# Patient Record
Sex: FEMALE | Race: WHITE | NOT HISPANIC OR LATINO
[De-identification: names, ages, dates, MRNs, and addresses within clinical notes are randomized per-mention and may not be internally consistent; named-entity substitution may affect disease eponyms.]

---

## 2019-02-25 ENCOUNTER — APPOINTMENT (OUTPATIENT)
Dept: CARDIOLOGY | Facility: CLINIC | Age: 79
End: 2019-02-25
Payer: MEDICARE

## 2019-02-25 VITALS
HEART RATE: 87 BPM | DIASTOLIC BLOOD PRESSURE: 57 MMHG | WEIGHT: 169 LBS | SYSTOLIC BLOOD PRESSURE: 112 MMHG | OXYGEN SATURATION: 97 %

## 2019-02-25 DIAGNOSIS — Z80.0 FAMILY HISTORY OF MALIGNANT NEOPLASM OF DIGESTIVE ORGANS: ICD-10-CM

## 2019-02-25 DIAGNOSIS — Z78.9 OTHER SPECIFIED HEALTH STATUS: ICD-10-CM

## 2019-02-25 DIAGNOSIS — Z86.010 PERSONAL HISTORY OF COLONIC POLYPS: ICD-10-CM

## 2019-02-25 DIAGNOSIS — Z87.19 PERSONAL HISTORY OF OTHER DISEASES OF THE DIGESTIVE SYSTEM: ICD-10-CM

## 2019-02-25 DIAGNOSIS — Z87.898 PERSONAL HISTORY OF OTHER SPECIFIED CONDITIONS: ICD-10-CM

## 2019-02-25 DIAGNOSIS — Z86.39 PERSONAL HISTORY OF OTHER ENDOCRINE, NUTRITIONAL AND METABOLIC DISEASE: ICD-10-CM

## 2019-02-25 DIAGNOSIS — Z86.79 PERSONAL HISTORY OF OTHER DISEASES OF THE CIRCULATORY SYSTEM: ICD-10-CM

## 2019-02-25 DIAGNOSIS — Z82.3 FAMILY HISTORY OF STROKE: ICD-10-CM

## 2019-02-25 DIAGNOSIS — F41.8 OTHER SPECIFIED ANXIETY DISORDERS: ICD-10-CM

## 2019-02-25 DIAGNOSIS — M19.90 UNSPECIFIED OSTEOARTHRITIS, UNSPECIFIED SITE: ICD-10-CM

## 2019-02-25 DIAGNOSIS — Z86.19 PERSONAL HISTORY OF OTHER INFECTIOUS AND PARASITIC DISEASES: ICD-10-CM

## 2019-02-25 DIAGNOSIS — Z86.69 PERSONAL HISTORY OF OTHER DISEASES OF THE NERVOUS SYSTEM AND SENSE ORGANS: ICD-10-CM

## 2019-02-25 PROCEDURE — 99215 OFFICE O/P EST HI 40 MIN: CPT

## 2019-02-25 PROCEDURE — 93000 ELECTROCARDIOGRAM COMPLETE: CPT

## 2019-02-27 ENCOUNTER — NON-APPOINTMENT (OUTPATIENT)
Age: 79
End: 2019-02-27

## 2019-02-27 PROBLEM — Z80.0 FAMILY HISTORY OF MALIGNANT NEOPLASM OF STOMACH: Status: ACTIVE | Noted: 2019-02-27

## 2019-02-27 PROBLEM — Z87.19 HISTORY OF DIVERTICULOSIS: Status: RESOLVED | Noted: 2019-02-27 | Resolved: 2019-02-27

## 2019-02-27 PROBLEM — Z86.79 HISTORY OF HYPERTENSION: Status: RESOLVED | Noted: 2019-02-27 | Resolved: 2019-02-27

## 2019-02-27 PROBLEM — Z86.79 HISTORY OF RIGHT BUNDLE BRANCH BLOCK (RBBB): Status: RESOLVED | Noted: 2019-02-27 | Resolved: 2019-02-27

## 2019-02-27 PROBLEM — Z78.9 SOCIAL ALCOHOL USE: Status: ACTIVE | Noted: 2019-02-27

## 2019-02-27 PROBLEM — Z86.39 HISTORY OF OBESITY: Status: RESOLVED | Noted: 2019-02-27 | Resolved: 2019-02-27

## 2019-02-27 PROBLEM — Z80.0 FAMILY HISTORY OF MALIGNANT NEOPLASM OF COLON: Status: ACTIVE | Noted: 2019-02-27

## 2019-02-27 PROBLEM — Z82.3 FAMILY HISTORY OF CEREBROVASCULAR ACCIDENT (CVA): Status: ACTIVE | Noted: 2019-02-27

## 2019-02-27 RX ORDER — UBIDECARENONE/VIT E ACET 100MG-5
CAPSULE ORAL
Refills: 0 | Status: ACTIVE | COMMUNITY

## 2019-02-27 RX ORDER — CHOLECALCIFEROL (VITAMIN D3) 25 MCG
TABLET ORAL
Refills: 0 | Status: ACTIVE | COMMUNITY

## 2019-02-28 PROBLEM — Z86.010 HISTORY OF COLONIC POLYPS: Status: RESOLVED | Noted: 2019-02-27 | Resolved: 2019-02-28

## 2019-02-28 PROBLEM — Z86.19 HISTORY OF HERPES ZOSTER: Status: RESOLVED | Noted: 2019-02-28 | Resolved: 2019-02-28

## 2019-02-28 PROBLEM — Z86.39 HISTORY OF HYPERLIPIDEMIA: Status: RESOLVED | Noted: 2019-02-27 | Resolved: 2019-02-28

## 2019-02-28 PROBLEM — M19.90 OSTEOARTHRITIS: Status: RESOLVED | Noted: 2019-02-28 | Resolved: 2019-02-28

## 2019-02-28 PROBLEM — F41.8 ANXIOUS DEPRESSION: Status: RESOLVED | Noted: 2019-02-28 | Resolved: 2019-02-28

## 2019-02-28 PROBLEM — Z87.898 HISTORY OF PALPITATIONS: Status: RESOLVED | Noted: 2019-02-28 | Resolved: 2019-02-28

## 2019-02-28 PROBLEM — Z86.69 HISTORY OF CARPAL TUNNEL SYNDROME: Status: RESOLVED | Noted: 2019-02-28 | Resolved: 2019-02-28

## 2019-02-28 NOTE — HISTORY OF PRESENT ILLNESS
[FreeTextEntry1] : 78-year-old female\par Routine followup\par "I feel okay." Dionna denies chest pain, shortness of breath at rest palpitations or syncope. Main complaint is that of arthritis and weight gain.\par \par Mrs. Fang is accompanied today by her

## 2019-02-28 NOTE — DISCUSSION/SUMMARY
[FreeTextEntry1] : Hypertension\par Hypertension has been controlled on present medical regimen .\par \par I have recommended the following:\par 1. Low-salt low-fat low-cholesterol diet. Regular aerobic exercise and weight loss\par 2 continue the present medical regimen\par \par \par Hyperlipidemia\par Hyperlipidemia represents a risk factor for development of atherosclerotic heart disease. However, there is no evidence of such to date. A 2/10 stress sestamibi study was normal. The resting 2/19 electrocardiogram shows no evidence of myocardial ischemia or infarction. The target LDL level for primary prevention is about 100. The most recent lipid levels are not available for review. The dose of atorvastatin may be increased if required to obtain optimum levels. Nonpharmacological therapy, specifically diet exercise and weight loss are emphasized as  major aspect of treatment.\par \par I have recommended the following:\par 1 low-salt low-fat low-cholesterol diet. Regular aerobic exercise\par 2 continue the present medical regimen\par 3 target LDL level to about 100 as discussed above \par 4 routine laboratory studies including lipid profile through  primary care Dr. Reynolds.\par 4 increase atorvastatin dose if required to obtain optimal levels as noted above.\par \par \par Valvular heart disease\par The 5/15 echocardiogram demonstrated calcification of the mitral valve annulus and mild mitral regurgitation. Aortic valve sclerosis and minimal aortic regurgitation were also noted. The pulmonary artery systolic pressure was on the upper limit of normal at 30-35 mmHg. The left ventricular ejection fraction was 70-75%. The present cardiac physical examination is not suggestive of severe mitral/aortic  regurgitation. In view of the lack of symptoms, absence of heart failure and clinical course continued monitoring without intervention is indicated at this time.\par \par I have recommended the following:\par 1 no further cardiac testing for this problem at this time\par 2 echocardiogram with next office  evaluation in one year.\par \par \par Right bundle branch block\par Mrs. 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 The OK interval and axis are normal. Pacemaker implantation is not indicated in the absence of symptomatic bradycardia or high degree AV block.\par \par I have recommended the following:\par 1 no further cardiac testing for this problem at this time\par \par \par Obesity\par Obesity exacerbates Dionna's cardiovascular issues. Today Mrs. Fang is 5 feet 3 inches tall and weighs 169 pounds. She has gained 3 pounds in the last 1.5 years. Diet and exercise are advised.

## 2019-03-08 ENCOUNTER — RECORD ABSTRACTING (OUTPATIENT)
Age: 79
End: 2019-03-08

## 2019-03-08 LAB — CYTOLOGY CVX/VAG DOC THIN PREP: NORMAL

## 2019-04-10 ENCOUNTER — APPOINTMENT (OUTPATIENT)
Dept: OBGYN | Facility: CLINIC | Age: 79
End: 2019-04-10
Payer: MEDICARE

## 2019-04-10 VITALS
DIASTOLIC BLOOD PRESSURE: 76 MMHG | WEIGHT: 165 LBS | BODY MASS INDEX: 26.52 KG/M2 | SYSTOLIC BLOOD PRESSURE: 122 MMHG | HEIGHT: 66 IN

## 2019-04-10 DIAGNOSIS — Z12.31 ENCOUNTER FOR SCREENING MAMMOGRAM FOR MALIGNANT NEOPLASM OF BREAST: ICD-10-CM

## 2019-04-10 PROCEDURE — G0101: CPT

## 2019-04-10 PROCEDURE — 99203 OFFICE O/P NEW LOW 30 MIN: CPT | Mod: 25

## 2019-04-10 PROCEDURE — 76830 TRANSVAGINAL US NON-OB: CPT

## 2019-04-13 LAB
CYTOLOGY CVX/VAG DOC THIN PREP: NORMAL
HPV HIGH+LOW RISK DNA PNL CVX: NOT DETECTED

## 2020-02-18 ENCOUNTER — APPOINTMENT (OUTPATIENT)
Dept: CARDIOLOGY | Facility: CLINIC | Age: 80
End: 2020-02-18
Payer: MEDICARE

## 2020-02-18 PROCEDURE — 93306 TTE W/DOPPLER COMPLETE: CPT

## 2020-02-26 ENCOUNTER — NON-APPOINTMENT (OUTPATIENT)
Age: 80
End: 2020-02-26

## 2020-02-26 ENCOUNTER — APPOINTMENT (OUTPATIENT)
Dept: CARDIOLOGY | Facility: CLINIC | Age: 80
End: 2020-02-26
Payer: MEDICARE

## 2020-02-26 VITALS
SYSTOLIC BLOOD PRESSURE: 142 MMHG | OXYGEN SATURATION: 98 % | BODY MASS INDEX: 27.55 KG/M2 | HEART RATE: 71 BPM | WEIGHT: 160.5 LBS | DIASTOLIC BLOOD PRESSURE: 60 MMHG

## 2020-02-26 PROCEDURE — 99215 OFFICE O/P EST HI 40 MIN: CPT

## 2020-02-26 PROCEDURE — 93000 ELECTROCARDIOGRAM COMPLETE: CPT

## 2020-02-26 RX ORDER — B-COMPLEX WITH VITAMIN C
TABLET ORAL
Refills: 0 | Status: ACTIVE | COMMUNITY

## 2020-02-27 NOTE — HISTORY OF PRESENT ILLNESS
[FreeTextEntry1] : 79-year-old female\par Routine followup\par "I feel okay " Dionna denies chest pain, shortness of breath, palpitations or syncope. Her main complaint is that of generalized fatigue.

## 2020-02-27 NOTE — PHYSICAL EXAM
[Normal Conjunctiva] : the conjunctiva exhibited no abnormalities [Auscultation Breath Sounds / Voice Sounds] : lungs were clear to auscultation bilaterally [Heart Rate And Rhythm] : heart rate and rhythm were normal [Murmurs] : no murmurs present [Heart Sounds] : normal S1 and S2 [Arterial Pulses Normal] : the arterial pulses were normal [Edema] : no peripheral edema present [Bowel Sounds] : normal bowel sounds [Abdomen Soft] : soft [Abnormal Walk] : normal gait [Cyanosis, Localized] : no localized cyanosis [Affect] : the affect was normal [] : no rash [FreeTextEntry1] : dorsalis pedis pulses +1-2  bilaterally

## 2021-02-20 ENCOUNTER — TRANSCRIPTION ENCOUNTER (OUTPATIENT)
Age: 81
End: 2021-02-20

## 2021-03-04 ENCOUNTER — APPOINTMENT (OUTPATIENT)
Dept: OBGYN | Facility: CLINIC | Age: 81
End: 2021-03-04
Payer: MEDICARE

## 2021-03-04 VITALS
SYSTOLIC BLOOD PRESSURE: 120 MMHG | WEIGHT: 167 LBS | BODY MASS INDEX: 28.51 KG/M2 | DIASTOLIC BLOOD PRESSURE: 62 MMHG | HEIGHT: 64 IN

## 2021-03-04 DIAGNOSIS — Z00.00 ENCOUNTER FOR GENERAL ADULT MEDICAL EXAMINATION W/OUT ABNORMAL FINDINGS: ICD-10-CM

## 2021-03-04 DIAGNOSIS — M85.80 OTHER SPECIFIED DISORDERS OF BONE DENSITY AND STRUCTURE, UNSPECIFIED SITE: ICD-10-CM

## 2021-03-04 PROCEDURE — 76830 TRANSVAGINAL US NON-OB: CPT

## 2021-03-04 PROCEDURE — G0101: CPT

## 2021-03-04 NOTE — HISTORY OF PRESENT ILLNESS
[FreeTextEntry1] : 79yo  postmenopausal without HRT here for Gyn exam and  surveillance of right ovarian cyst. Has received Covid vaccine x 2. Has osteopenia per BMD 2016 but has had no intervention/follow up. Has colonoscopy with Dr. Thompson-h/o polyps. [Mammogramdate] : 10/30/20 [TextBox_19] : [facility] BIRADS 2 [PapSmeardate] : 4/10/19 [TextBox_31] : Neg/HPV Neg [BoneDensityDate] : 4/11/16 [TextBox_37] : T Score Spine -1.1 Hip -1.1 Fem Neck -2.3 [ColonoscopyDate] : 2/2/17 [TextBox_43] : Negative 2-3 year recall

## 2021-03-05 DIAGNOSIS — Z13.820 ENCOUNTER FOR SCREENING FOR OSTEOPOROSIS: ICD-10-CM

## 2021-03-07 LAB — HPV HIGH+LOW RISK DNA PNL CVX: NOT DETECTED

## 2021-03-08 LAB — CYTOLOGY CVX/VAG DOC THIN PREP: ABNORMAL

## 2021-03-11 ENCOUNTER — NON-APPOINTMENT (OUTPATIENT)
Age: 81
End: 2021-03-11

## 2021-03-11 ENCOUNTER — APPOINTMENT (OUTPATIENT)
Dept: CARDIOLOGY | Facility: CLINIC | Age: 81
End: 2021-03-11
Payer: MEDICARE

## 2021-03-11 VITALS
TEMPERATURE: 97.7 F | SYSTOLIC BLOOD PRESSURE: 130 MMHG | OXYGEN SATURATION: 96 % | BODY MASS INDEX: 29.18 KG/M2 | WEIGHT: 170 LBS | DIASTOLIC BLOOD PRESSURE: 60 MMHG | HEART RATE: 87 BPM

## 2021-03-11 DIAGNOSIS — Z87.39 PERSONAL HISTORY OF OTHER DISEASES OF THE MUSCULOSKELETAL SYSTEM AND CONNECTIVE TISSUE: ICD-10-CM

## 2021-03-11 PROCEDURE — 99214 OFFICE O/P EST MOD 30 MIN: CPT

## 2021-03-11 PROCEDURE — 93000 ELECTROCARDIOGRAM COMPLETE: CPT

## 2021-03-14 PROBLEM — Z87.39 HISTORY OF OSTEOPENIA: Status: RESOLVED | Noted: 2021-03-14 | Resolved: 2021-03-14

## 2021-03-14 NOTE — DISCUSSION/SUMMARY
[FreeTextEntry1] : Hypertension\par Hypertension has been controlled on present medical regimen .\par \par I have recommended the following:\par 1. Low-salt low-fat low-cholesterol diet. Regular aerobic exercise and weight loss\par 2 continue the present medical regimen\par \par \par Hyperlipidemia\par Hyperlipidemia represents a risk factor for development of atherosclerotic heart disease. However, there is no evidence of such to date. A 2/10 stress sestamibi study was normal. The resting  3/21  electrocardiogram shows no evidence of myocardial ischemia or infarction. The target LDL level for primary prevention is about 100. HMG coA reductase inhibitor therapy has been effective. In 3/21 the serum cholesterol level  was  193 triglycerides 88  and HDL 68. The dose of atorvastatin may be increased if required to obtain optimum levels. Nonpharmacological therapy, specifically diet exercise and weight loss are emphasized as  major aspect of treatment.\par \par I have recommended the following:\par 1 low-salt low-fat low-cholesterol diet. Regular aerobic exercise\par 2 continue the present medical regimen\par 3 target LDL level to about 100 as discussed above \par 4 routine laboratory studies including lipid profile through  primary care Dr. Reynolds.\par 4 increase atorvastatin dose if required to obtain optimal levels as noted above.\par \par \par Valvular heart disease\par The 2/20  echocardiogram demonstrated calcification of the mitral valve annulus and trace  mitral regurgitation.  The pulmonary artery systolic pressure was normal at  19 mmHg  The left ventricular ejection fraction was 68 %. The present cardiac physical examination is not suggestive of hemodynamically significant valvular pathology. In view of the lack of symptoms, absence of heart failure and clinical course continued monitoring without intervention is indicated at this time.\par \par I have recommended the following:\par 1 no further cardiac testing for this problem at this time\par \par \par \par Right bundle branch block\par Mrs. 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 The OH interval and axis are normal. Pacemaker implantation is not indicated in the absence of symptomatic bradycardia or high degree AV block.\par \par I have recommended the following:\par 1 no further cardiac testing for this problem at this time\par \par \par Obesity\par Obesity exacerbates Dionna's cardiovascular issues. Today Mrs. Fang is 5 feet 3 inches tall and weighs 170 pounds. She has gained 10 pounds in the last year.   Diet and exercise are advised. \par \par \par \par The  diagnosis, prognosis, risks, options and alternatives were explained at length to the patient. All questions were answered. Issues discussed included hypertension, abnormal electrocardiogram/right bundle branch block, valvular heart disease obesity hyperlipidemia diet and exercise.\par \par \par Counseling and/or coordination of care\par Time was a significant factor for this patient encounter. Total time spent with the patient was 30  minutes. 50% of the time was devoted to counseling and/or coordination of care.

## 2021-03-14 NOTE — HISTORY OF PRESENT ILLNESS
[FreeTextEntry1] : 80-year-old female\par Routine followup\par "I feel okay." Dionna denies chest pain, palpitations, shortness of breath at rest or syncope. She has received vaccination for the Covid  19 virus.

## 2021-04-05 ENCOUNTER — RESULT REVIEW (OUTPATIENT)
Age: 81
End: 2021-04-05

## 2021-04-20 ENCOUNTER — APPOINTMENT (OUTPATIENT)
Dept: GASTROENTEROLOGY | Facility: CLINIC | Age: 81
End: 2021-04-20
Payer: MEDICARE

## 2021-04-20 VITALS
HEIGHT: 64 IN | SYSTOLIC BLOOD PRESSURE: 118 MMHG | HEART RATE: 72 BPM | WEIGHT: 166 LBS | BODY MASS INDEX: 28.34 KG/M2 | TEMPERATURE: 97.5 F | DIASTOLIC BLOOD PRESSURE: 62 MMHG

## 2021-04-20 DIAGNOSIS — Z86.010 PERSONAL HISTORY OF COLONIC POLYPS: ICD-10-CM

## 2021-04-20 PROCEDURE — 99213 OFFICE O/P EST LOW 20 MIN: CPT

## 2021-04-22 NOTE — ASSESSMENT
[FreeTextEntry1] : 1.  patient is in excellent medical health\par \par 2.  no cardiovascular disease\par \par 3.  she definitely needs a four year follow up because of a carpet like polpoid lesion flat and spreading type, of the ascending colon, removed in feb 2014\par \par 4.  has rather typical but mild reflux sx which she can largely control with dietary management\par \par 5.  i would like to try prn Gaviscon advance\par \par 6  however, i am advising colonoscopy but i do not feel that egd needs to be done; the last having been done in 2016...\par \par plan;\par 1.  set up colonoscopy\par AS WE OBTAIN INFORMED CONSENT FOR COLONOSCOPY, UPPER ENDOSCOPY [EGD], OR BOTH PROCEDURES TOGETHER;\par \par As with all procedures, there are risks of which the patient should be aware\par \par 1.  Anesthesia; deep sedation with Propofol;  there is a small risk of aspiration and pulmonary infection.  The Anesthesiologist meets with the patient the morning of the procedure to discuss in more detail\par \par 2.  risk of bleeding; from removal of a polyp, or rarely, from biopsies, 1 % or less\par \par 3.  risk of injury or perforation of the colon or upper GI tract; one in a thousand or less,  from removing a polyp or from advancing the instrument\par \par \par More than 50% of the face to face time was devoted to counseling and /or coordination of care.  THis coordination of care may have included reviewing other medical notes and reports, and communicating with other health professionals\par

## 2021-04-22 NOTE — HISTORY OF PRESENT ILLNESS
[FreeTextEntry1] : patient is here for a four year follow up, regarding a history of a large, flat, superficially spreading type of adenoma of the ascending colon, greater than 2 cm in diameter, removed by polypectomy in feb 2014\par \par negative fu in nov 2014\par \par last colonoscopic follow up was feb 2017, and was negative\par \par patient has some reflux symptoms, on no daily meds, mostly in response to classic esophageal irritants.\par \par she lets it pass when she experiences the heartburn\par \par she can largely prevent these sx by eating differently or limiting or avoiding the triggers\par \par no angina, no exertional chest pain, no history of coronary symptoms, sees dr Reynolds for her hypertension\par \par

## 2021-05-11 ENCOUNTER — RESULT REVIEW (OUTPATIENT)
Age: 81
End: 2021-05-11

## 2021-05-14 ENCOUNTER — APPOINTMENT (OUTPATIENT)
Dept: GASTROENTEROLOGY | Facility: HOSPITAL | Age: 81
End: 2021-05-14

## 2022-04-04 ENCOUNTER — APPOINTMENT (OUTPATIENT)
Dept: CARDIOLOGY | Facility: CLINIC | Age: 82
End: 2022-04-04
Payer: MEDICARE

## 2022-04-04 ENCOUNTER — NON-APPOINTMENT (OUTPATIENT)
Age: 82
End: 2022-04-04

## 2022-04-04 VITALS
BODY MASS INDEX: 29.71 KG/M2 | HEART RATE: 84 BPM | DIASTOLIC BLOOD PRESSURE: 71 MMHG | WEIGHT: 174 LBS | SYSTOLIC BLOOD PRESSURE: 134 MMHG | HEIGHT: 64 IN | OXYGEN SATURATION: 97 %

## 2022-04-04 DIAGNOSIS — Z87.19 PERSONAL HISTORY OF OTHER DISEASES OF THE DIGESTIVE SYSTEM: ICD-10-CM

## 2022-04-04 PROCEDURE — 99214 OFFICE O/P EST MOD 30 MIN: CPT

## 2022-04-04 PROCEDURE — 93000 ELECTROCARDIOGRAM COMPLETE: CPT

## 2022-04-05 ENCOUNTER — NON-APPOINTMENT (OUTPATIENT)
Age: 82
End: 2022-04-05

## 2022-04-05 PROBLEM — Z87.19 HISTORY OF GASTROESOPHAGEAL REFLUX (GERD): Status: RESOLVED | Noted: 2019-02-27 | Resolved: 2022-04-05

## 2022-04-05 NOTE — DISCUSSION/SUMMARY
[FreeTextEntry1] : Hypertension\par Hypertension has been controlled on present medical regimen .The dose of losartan may be increased if required to maintain optimum levels\par \par I have recommended the following:\par 1. Low-salt low-fat low-cholesterol diet. Regular aerobic exercise and weight loss\par 2 continue the present medical regimen\par 3. increase losartan dose  if required to maintain optimal levels as discussed above\par \par \par \par \par Hyperlipidemia\par Hyperlipidemia represents a risk factor for development of atherosclerotic heart disease. However, there is no evidence of such to date. A 2/10 stress sestamibi study was normal.  The target LDL level for primary prevention is about 100. The most recent lipid levels are not available for review. The dose of atorvastatin may be increased if required to obtain optimum levels. Nonpharmacological therapy, specifically diet exercise and weight loss are emphasized as  major aspect of treatment.\par \par I have recommended the following:\par 1 low-salt low-fat low-cholesterol diet. Regular aerobic exercise\par 2 continue the present medical regimen\par 3 target LDL level to about 100 as discussed above \par 4 routine laboratory studies including lipid profile through  primary care Dr. Reynolds.\par 4 increase atorvastatin dose if required to obtain optimal levels as noted above.\par \par \par \par \par Valvular heart disease\par The 2/20  echocardiogram demonstrated calcification of the mitral valve annulus and trace  mitral regurgitation.  The pulmonary artery systolic pressure was normal at  19 mmHg  The left ventricular ejection fraction was 68 %. The present cardiac physical examination is not suggestive of hemodynamically significant valvular pathology. In view of the lack of symptoms, absence of heart failure and clinical course continued monitoring without intervention is indicated at this time.\par \par I have recommended the following:\par 1 no further cardiac testing for this problem at this time\par 2. Anticipate echocardiogram 2023\par \par \par \par Right bundle branch block\par Mrs. 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 The WI interval and axis are normal. Pacemaker implantation is not indicated in the absence of symptomatic bradycardia or high degree AV block.\par \par I have recommended the following:\par 1 no further cardiac testing for this problem at this time\par \par \par Obesity\par Obesity exacerbates Dionna's cardiovascular issues. Today Mrs. Fang is 5 feet 3 inches tall and weighs 174  pounds.  She has gained 14 pounds in the last year    Diet and exercise are advised. \par \par \par \par The  diagnosis, prognosis, risks, options and alternatives were explained at length to the patient. All questions were answered. Issues discussed included hypertension, abnormal electrocardiogram/right bundle branch block, valvular heart disease  obesity noninvasive cardiac testing diet and exercise.\par \par \par Counseling and/or coordination of care\par Time was a significant factor for this patient encounter. Total time spent with the patient was 30  minutes. 50% of the time was devoted to counseling and/or coordination of care.

## 2022-04-05 NOTE — DISCUSSION/SUMMARY
[FreeTextEntry1] : Hypertension\par Hypertension has been controlled on present medical regimen .The dose of losartan may be increased if required to maintain optimum levels\par \par I have recommended the following:\par 1. Low-salt low-fat low-cholesterol diet. Regular aerobic exercise and weight loss\par 2 continue the present medical regimen\par 3. increase losartan dose  if required to maintain optimal levels as discussed above\par \par \par \par \par Hyperlipidemia\par Hyperlipidemia represents a risk factor for development of atherosclerotic heart disease. However, there is no evidence of such to date. A 2/10 stress sestamibi study was normal.  The target LDL level for primary prevention is about 100. The most recent lipid levels are not available for review. The dose of atorvastatin may be increased if required to obtain optimum levels. Nonpharmacological therapy, specifically diet exercise and weight loss are emphasized as  major aspect of treatment.\par \par I have recommended the following:\par 1 low-salt low-fat low-cholesterol diet. Regular aerobic exercise\par 2 continue the present medical regimen\par 3 target LDL level to about 100 as discussed above \par 4 routine laboratory studies including lipid profile through  primary care Dr. Reynolds.\par 4 increase atorvastatin dose if required to obtain optimal levels as noted above.\par \par \par \par \par Valvular heart disease\par The 2/20  echocardiogram demonstrated calcification of the mitral valve annulus and trace  mitral regurgitation.  The pulmonary artery systolic pressure was normal at  19 mmHg  The left ventricular ejection fraction was 68 %. The present cardiac physical examination is not suggestive of hemodynamically significant valvular pathology. In view of the lack of symptoms, absence of heart failure and clinical course continued monitoring without intervention is indicated at this time.\par \par I have recommended the following:\par 1 no further cardiac testing for this problem at this time\par 2. Anticipate echocardiogram 2023\par \par \par \par Right bundle branch block\par Mrs. 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 The SD interval and axis are normal. Pacemaker implantation is not indicated in the absence of symptomatic bradycardia or high degree AV block.\par \par I have recommended the following:\par 1 no further cardiac testing for this problem at this time\par \par \par Obesity\par Obesity exacerbates Dionna's cardiovascular issues. Today Mrs. Fang is 5 feet 3 inches tall and weighs 174  pounds.  She has gained 14 pounds in the last year    Diet and exercise are advised. \par \par \par \par The  diagnosis, prognosis, risks, options and alternatives were explained at length to the patient. All questions were answered. Issues discussed included hypertension, abnormal electrocardiogram/right bundle branch block, valvular heart disease  obesity noninvasive cardiac testing diet and exercise.\par \par \par Counseling and/or coordination of care\par Time was a significant factor for this patient encounter. Total time spent with the patient was 30  minutes. 50% of the time was devoted to counseling and/or coordination of care.

## 2022-04-05 NOTE — HISTORY OF PRESENT ILLNESS
[FreeTextEntry1] : 81-year-old female\par Routine followup\par "I feel okay."  Dionna denies chest pain, shortness of breath, palpitations or syncope. She has not been exercising and has gained weight. Her activity is  limited by knee pain attributed to osteoarthritis.

## 2022-04-14 DIAGNOSIS — Z12.39 ENCOUNTER FOR OTHER SCREENING FOR MALIGNANT NEOPLASM OF BREAST: ICD-10-CM

## 2022-05-18 ENCOUNTER — APPOINTMENT (OUTPATIENT)
Dept: BARIATRICS/WEIGHT MGMT | Facility: CLINIC | Age: 82
End: 2022-05-18
Payer: MEDICARE

## 2022-05-18 VITALS
HEIGHT: 64 IN | TEMPERATURE: 98 F | HEART RATE: 88 BPM | WEIGHT: 171 LBS | BODY MASS INDEX: 29.19 KG/M2 | OXYGEN SATURATION: 97 % | SYSTOLIC BLOOD PRESSURE: 130 MMHG | DIASTOLIC BLOOD PRESSURE: 80 MMHG

## 2022-05-18 DIAGNOSIS — F41.9 ANXIETY DISORDER, UNSPECIFIED: ICD-10-CM

## 2022-05-18 DIAGNOSIS — G57.00 LESION OF SCIATIC NERVE, UNSPECIFIED LOWER LIMB: ICD-10-CM

## 2022-05-18 PROCEDURE — 99205 OFFICE O/P NEW HI 60 MIN: CPT

## 2022-05-18 RX ORDER — ALPRAZOLAM 1 MG/1
1 TABLET ORAL
Refills: 0 | Status: ACTIVE | COMMUNITY
Start: 2022-02-23

## 2022-05-18 RX ORDER — LOSARTAN POTASSIUM 50 MG/1
50 TABLET, FILM COATED ORAL
Refills: 0 | Status: ACTIVE | COMMUNITY
Start: 2021-03-08

## 2022-05-18 RX ORDER — ATORVASTATIN CALCIUM 80 MG/1
TABLET, FILM COATED ORAL
Refills: 0 | Status: ACTIVE | COMMUNITY

## 2022-05-18 RX ORDER — HYDROCHLOROTHIAZIDE 12.5 MG/1
12.5 TABLET ORAL
Refills: 0 | Status: ACTIVE | COMMUNITY
Start: 2021-03-08

## 2022-05-18 NOTE — REVIEW OF SYSTEMS
[Negative] : Allergic/Immunologic [MED-ROS-Musclo-FT] : bilateral knee pain, sciatica-type pain [MED-ROS-Psych-FT] : some anxiety in evenings

## 2022-05-18 NOTE — PHYSICAL EXAM
[Obese, well nourished, in no acute distress] : obese, well nourished, in no acute distress [Normal] : affect appropriate [de-identified] : mallampatti 3-4

## 2022-05-18 NOTE — ASSESSMENT
[FreeTextEntry1] : 81F PMH overweight, HTN, HLD, valvular heart disease, RBBB, GERD, anxiety, bilateral knee OA who presents to weight management for initial evaluation.\par \par # Overweight c/b HTN, HLD, bilateral knee OA: Weight today 171 lbs, BMI 29.35. Goal to lose 10 lbs to improve her OA symptoms. Noted weight gain during the pandemic likely primarily due to decreased activity, with some noted increase in comfort eating. Her diet is noted for some take-out/restaurants, processed carbohydrates/sugars, and some late-evening intake. She is getting ~5k steps daily but partly limited by knee osteoarthritis. Possibility of BEKAH.\par - Will rec food log\par - Discussed her grain intake would be best in form of whole grains (ie steel cut oatmeal) over bread/crackers\par - If able to tolerate and get used to it, advise not eating snack before bed. \par - Continue to incorporate legumes as a protein sourde. 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 Discussed with patient that weight loss w/exercise is slower than diet due to muscle retention and lesser caloric deficits, but given her age it would be best to maintain and optimize physical/functional capacity rather than focus as strongly on caloric restriction which may reduce muscle mass.\par - Will consider further eval into BEKAH in the future.

## 2022-05-18 NOTE — HISTORY OF PRESENT ILLNESS
[FreeTextEntry1] : 81F PMH overweight, HTN, HLD, valvular heart disease, RBBB, GERD, anxiety, bilateral knee OA who presents to weight management for initial evaluation.\par \par She heard about the clinic when she called [employer] to inquire about a nutritionist.\par She would like to lose 10 lbs. \par \par Weight/Diet History:\par She weighed about 160 lbs early in the pandemic, closer to 150 lbs years before that. She feels that she started putting on weight during the pandemic because she was less active (shopping/groceries online instead of walking in store, less social activity, getting outside less), while also ordering some comfort foods. She is also walking less because her dog passed away.\par She feels that it has affected her bilateral knee osteoarthritis and sciatica-type pain which has further limited her activity, and motivated her to lose weight to improve her pain. She feels she has been eating healthily and getting more activity but doesn't seem to be able to crack the 170 lbs fabian. Is unsure if certain foods she is eating are not helping.\par She has done Weight Watchers several times, and had some successes. She last went ~4 years ago and felt that her weight wasn't changing as fast as she'd like. \par She will focus on portion control.\par \par Weight today: 171 lbs, BMI 29.35\par \par Diet: Rare beef, potatoes, pastas. Gets up at ~7:00 am.\par B: Steel cut oatmeal and sometimes a banana, with low fat milk. Sometimes a slice of toast with a cup of tea.\par L: Salad, toast if she didn't earlier, fruit\par D: Chicken, vegetables. Occaisonal fish. Sometimes salads.\par Dessert: 1 cookie\par Snack: Just something small before bed (ie rice cake) with butter. \par Beverages: Tea with a touch of sugar and low fat milk, Coffee with milk. \par Night-time eating: Feels she needs to eat something before going to sleep (ie rice cake) or wakes up hun(gry\par Binging: \par Fast-food/Restaurants: Restaurants a couple of times per month, sometimes orders a pizza. \par Cook/Prepare meals: Cook\par Added oils:\par Food Journal: On Weight Watchers.\par \par Exercise: Limited by bilateral knee OA. Counts steps and is aiming for 10k/day, estimates that she's getting 5-6k daily. She goes on walks, total of 2 miles most days that the weather is nice, split up into multiple separate walks. \par Currently does PT for her L shoulder. \par \par Sleep: Goes to bed near midnight, wakes up ~7 am. Light sleeper, interrupted to pee, sometimes hunger. She may snore sometimes.\par \par Social Hx: No history of smoking, no drug use. Drinks alcohol socially on rare occasion. Lives with .

## 2023-03-08 ENCOUNTER — APPOINTMENT (OUTPATIENT)
Dept: OBGYN | Facility: CLINIC | Age: 83
End: 2023-03-08
Payer: MEDICARE

## 2023-03-08 VITALS
HEIGHT: 64 IN | DIASTOLIC BLOOD PRESSURE: 61 MMHG | WEIGHT: 170 LBS | BODY MASS INDEX: 29.02 KG/M2 | SYSTOLIC BLOOD PRESSURE: 132 MMHG

## 2023-03-08 DIAGNOSIS — N83.291 OTHER OVARIAN CYST, RIGHT SIDE: ICD-10-CM

## 2023-03-08 DIAGNOSIS — Z01.419 ENCOUNTER FOR GYNECOLOGICAL EXAMINATION (GENERAL) (ROUTINE) W/OUT ABNORMAL FINDINGS: ICD-10-CM

## 2023-03-08 PROCEDURE — 76830 TRANSVAGINAL US NON-OB: CPT

## 2023-03-08 PROCEDURE — G0101: CPT

## 2023-03-08 NOTE — HISTORY OF PRESENT ILLNESS
[FreeTextEntry1] : 83yo  postmenopausal without HRT here for Gyn exam and surveillance of right ovarian cyst. Has had no vaginal staining or bleeding.. Has osteopenia not requiring pharmacologic treatment. Has colonoscopy with Dr. Thompson-h/o polyps. \par \par Preventative Visit: \par Mammogram: 5/3/22, [facility] BIRADS 2. \par PAP Smear: 3/4/21, Neg/HPV Neg. \par Bone Density: 21, T Score Spine -0.7 Hip -1.4 Fem Neck -2.3. \par Colonoscopy: 21, Negative 2-3 year recall.

## 2023-03-10 LAB — HPV HIGH+LOW RISK DNA PNL CVX: NOT DETECTED

## 2023-03-13 LAB — CYTOLOGY CVX/VAG DOC THIN PREP: ABNORMAL

## 2023-03-14 ENCOUNTER — APPOINTMENT (OUTPATIENT)
Dept: CARDIOLOGY | Facility: CLINIC | Age: 83
End: 2023-03-14
Payer: MEDICARE

## 2023-03-14 VITALS
SYSTOLIC BLOOD PRESSURE: 130 MMHG | WEIGHT: 169 LBS | OXYGEN SATURATION: 98 % | BODY MASS INDEX: 28.85 KG/M2 | HEART RATE: 78 BPM | DIASTOLIC BLOOD PRESSURE: 78 MMHG | RESPIRATION RATE: 16 BRPM | TEMPERATURE: 97.8 F | HEIGHT: 64 IN

## 2023-03-14 DIAGNOSIS — Z87.42 PERSONAL HISTORY OF OTHER DISEASES OF THE FEMALE GENITAL TRACT: ICD-10-CM

## 2023-03-14 DIAGNOSIS — U07.1 COVID-19: ICD-10-CM

## 2023-03-14 PROCEDURE — 93000 ELECTROCARDIOGRAM COMPLETE: CPT

## 2023-03-14 PROCEDURE — 99214 OFFICE O/P EST MOD 30 MIN: CPT

## 2023-03-19 ENCOUNTER — NON-APPOINTMENT (OUTPATIENT)
Age: 83
End: 2023-03-19

## 2023-03-19 PROBLEM — U07.1 COVID-19 VIRUS INFECTION: Status: RESOLVED | Noted: 2023-03-19 | Resolved: 2023-03-19

## 2023-03-19 PROBLEM — Z87.42 HISTORY OF OVARIAN CYST: Status: RESOLVED | Noted: 2023-03-19 | Resolved: 2023-03-19

## 2023-03-19 NOTE — DISCUSSION/SUMMARY
[FreeTextEntry1] : Hypertension\par Hypertension has been controlled on present medical regimen .The dose of losartan may be increased if required to maintain optimum levels\par \par I have recommended the following:\par 1. Low-salt low-fat low-cholesterol diet. Regular aerobic exercise and weight loss\par 2 continue the present medical regimen\par 3. increase losartan dose  if required to maintain optimal levels as discussed above\par \par \par \par \par Hyperlipidemia\par Hyperlipidemia represents a risk factor for development of atherosclerotic heart disease. However, there is no evidence of such to date. A 2/10 stress sestamibi study was normal.  The target LDL level for primary prevention is about 100. The most recent lipid levels are not available for review. The dose of atorvastatin may be increased if required to obtain optimum levels. Nonpharmacological therapy, specifically diet exercise and weight loss are emphasized as  major aspect of treatment.\par \par I have recommended the following:\par 1 low-salt low-fat low-cholesterol diet. Regular aerobic exercise\par 2 continue the present medical regimen\par 3 target LDL level to about 100 as discussed above \par 4 routine laboratory studies including lipid profile through  primary care Dr. Reynolds.\par 4 increase atorvastatin dose if required to obtain optimal levels as noted above.\par \par \par \par \par Valvular heart disease\par The 2/20  echocardiogram demonstrated calcification of the mitral valve annulus and trace  mitral regurgitation.  The pulmonary artery systolic pressure was normal at  19 mmHg  The left ventricular ejection fraction was 68 %. The present cardiac physical examination is not suggestive of hemodynamically significant valvular pathology. In view of the lack of symptoms, absence of heart failure and clinical course continued monitoring without intervention is indicated at this time.\par \par I have recommended the following:\par 1 no further cardiac testing for this problem at this time\par 2.  Echocardiogram with next office evaluation 2024 \par \par \par \par Right bundle branch block\par Mrs. 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 The NC interval and axis are normal. Pacemaker implantation is not indicated in the absence of symptomatic bradycardia or high degree AV block.\par \par I have recommended the following:\par 1 no further cardiac testing for this problem at this time\par \par \par Obesity\par Obesity exacerbates Dionna's cardiovascular issues. Today Mrs. Fang is 5 feet 3 inches tall and weighs 169  pounds.      Diet and exercise are advised. \par \par \par \par The  diagnosis, prognosis, risks, options and alternatives were explained at length to the patient. All questions were answered. Issues discussed included hypertension, abnormal electrocardiogram/right bundle branch block, valvular heart disease  obesity noninvasive cardiac testing diet and exercise.\par \par \par Counseling and/or coordination of care\par Time was a significant factor for this patient encounter. Total time spent with the patient was 30  minutes. 50% of the time was devoted to counseling and/or coordination of care.

## 2023-03-19 NOTE — HISTORY OF PRESENT ILLNESS
[FreeTextEntry1] : 82-year-old female\par Routine follow-up\par "I feel okay."  Dionna denies chest pain, shortness of breath, palpitations or syncope.  She continues to struggle with her weight

## 2023-03-19 NOTE — REVIEW OF SYSTEMS
[Feeling Fatigued] : feeling fatigued [Earache] : earache [Joint Pain] : joint pain [Negative] : Heme/Lymph [FreeTextEntry4] : Right ear discomfort [FreeTextEntry5] : See history of present illness

## 2024-03-11 ENCOUNTER — APPOINTMENT (OUTPATIENT)
Dept: CARDIOLOGY | Facility: CLINIC | Age: 84
End: 2024-03-11
Payer: MEDICARE

## 2024-03-11 DIAGNOSIS — I35.8 OTHER NONRHEUMATIC AORTIC VALVE DISORDERS: ICD-10-CM

## 2024-03-11 PROCEDURE — 93306 TTE W/DOPPLER COMPLETE: CPT

## 2024-03-13 PROBLEM — I35.8 AORTIC VALVE SCLEROSIS: Status: ACTIVE | Noted: 2019-12-23

## 2024-03-15 DIAGNOSIS — Z86.79 PERSONAL HISTORY OF OTHER DISEASES OF THE CIRCULATORY SYSTEM: ICD-10-CM

## 2024-03-22 ENCOUNTER — APPOINTMENT (OUTPATIENT)
Dept: CARDIOLOGY | Facility: CLINIC | Age: 84
End: 2024-03-22
Payer: MEDICARE

## 2024-03-22 ENCOUNTER — NON-APPOINTMENT (OUTPATIENT)
Age: 84
End: 2024-03-22

## 2024-03-22 VITALS
DIASTOLIC BLOOD PRESSURE: 63 MMHG | BODY MASS INDEX: 28.85 KG/M2 | SYSTOLIC BLOOD PRESSURE: 162 MMHG | HEART RATE: 90 BPM | WEIGHT: 169 LBS | OXYGEN SATURATION: 98 % | HEIGHT: 64 IN

## 2024-03-22 DIAGNOSIS — I45.10 UNSPECIFIED RIGHT BUNDLE-BRANCH BLOCK: ICD-10-CM

## 2024-03-22 DIAGNOSIS — I34.0 NONRHEUMATIC MITRAL (VALVE) INSUFFICIENCY: ICD-10-CM

## 2024-03-22 DIAGNOSIS — I10 ESSENTIAL (PRIMARY) HYPERTENSION: ICD-10-CM

## 2024-03-22 DIAGNOSIS — E78.5 HYPERLIPIDEMIA, UNSPECIFIED: ICD-10-CM

## 2024-03-22 DIAGNOSIS — E66.9 OBESITY, UNSPECIFIED: ICD-10-CM

## 2024-03-22 DIAGNOSIS — I38 ENDOCARDITIS, VALVE UNSPECIFIED: ICD-10-CM

## 2024-03-22 PROCEDURE — 93000 ELECTROCARDIOGRAM COMPLETE: CPT

## 2024-03-22 PROCEDURE — 99213 OFFICE O/P EST LOW 20 MIN: CPT

## 2024-03-22 NOTE — PHYSICAL EXAM
[Normal Conjunctiva] : the conjunctiva exhibited no abnormalities [Auscultation Breath Sounds / Voice Sounds] : lungs were clear to auscultation bilaterally [Heart Rate And Rhythm] : heart rate and rhythm were normal [Heart Sounds] : normal S1 and S2 [Murmurs] : no murmurs present [Arterial Pulses Normal] : the arterial pulses were normal [Edema] : no peripheral edema present [Bowel Sounds] : normal bowel sounds [Abnormal Walk] : normal gait [Abdomen Soft] : soft [Cyanosis, Localized] : no localized cyanosis [Affect] : the affect was normal [] : no rash [FreeTextEntry1] : pleasant

## 2024-03-22 NOTE — HISTORY OF PRESENT ILLNESS
[FreeTextEntry1] : 83-year-old female Routine follow-up for hypertension hyperlipidemia right bundle branch block valvular heart disease and obesity. "I feel okay."  Dionna denies chest pain, shortness of breath, palpitations or syncope.  Home blood pressure levels have been normal typically 130-140 mmHg systolic

## 2024-03-22 NOTE — DISCUSSION/SUMMARY
[FreeTextEntry1] : Hypertension Hypertension has been controlled on present medical regimen .Elevation of blood pressure on initial examination today (160/63 mmHg) is attributed to a whitecoat effect. The dose of losartan may be increased if required to maintain optimum levels  I have recommended the followin. Low-salt low-fat low-cholesterol diet. Regular aerobic exercise and weight loss 2 continue the present medical regimen 3. Home blood pressure monitoring 4. increase losartan dose  if required to maintain optimal levels as discussed above     Hyperlipidemia Hyperlipidemia represents a risk factor for development of atherosclerotic heart disease. However, there is no evidence of such to date. A 2/10 stress sestamibi study was normal.  The target LDL level for primary prevention is about 100. The most recent lipid levels are not available for review. The dose of atorvastatin may be increased if required to obtain optimum levels. Nonpharmacological therapy, specifically diet exercise and weight loss are emphasized as  major aspect of treatment.  I have recommended the followin low-salt low-fat low-cholesterol diet. Regular aerobic exercise 2 continue the present medical regimen 3 target LDL level to about 100 as discussed above  4 routine laboratory studies including lipid profile through  primary care Dr. Reynolds. 4 increase atorvastatin dose if required to obtain optimal levels as noted above.     Valvular heart disease /mitral regurgitation The 3/24 echocardiogram revealed mild calcification the mitral valve annulus with mild mitral and trace tricuspid/pulmonic regurgitation.  Mild aortic valve sclerosis was reported  The pulmonary artery systolic pressure was normal at 28 mmHg.  Left ventricular ejection fraction was 73%. The present cardiac physical examination is not suggestive of hemodynamically significant valvular pathology. In view of the lack of symptoms, absence of heart failure and clinical course continued monitoring without intervention is indicated at this time.  I have recommended the followin no further cardiac testing for this problem.     Right bundle branch block Mrs. 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 The ID interval and axis are normal. Pacemaker implantation is not indicated in the absence of symptomatic bradycardia or high degree AV block.  I have recommended the followin no further cardiac testing for this problem at this time   Obesity Obesity exacerbates Dionna's cardiovascular issues. Today Mrs. Fang is 5 feet 3 inches tall and weighs 169  pounds.      Diet and exercise are advised.     The  diagnosis, prognosis, risks, options and alternatives were explained at length to the patient. All questions were answered. Issues discussed included hypertension, abnormal electrocardiogram/right bundle branch block, valvular heart disease  obesity noninvasive cardiac testing diet and exercise.   Counseling and/or coordination of care Time was a significant factor for this patient encounter. Total time spent with the patient was 25  minutes. 50% of the time was devoted to counseling and/or coordination of care.

## 2025-02-20 ENCOUNTER — APPOINTMENT (OUTPATIENT)
Dept: OBGYN | Facility: CLINIC | Age: 85
End: 2025-02-20
Payer: MEDICARE

## 2025-02-20 VITALS
DIASTOLIC BLOOD PRESSURE: 70 MMHG | SYSTOLIC BLOOD PRESSURE: 140 MMHG | WEIGHT: 167 LBS | HEIGHT: 64 IN | BODY MASS INDEX: 28.51 KG/M2

## 2025-02-20 DIAGNOSIS — N83.291 OTHER OVARIAN CYST, RIGHT SIDE: ICD-10-CM

## 2025-02-20 PROCEDURE — 76830 TRANSVAGINAL US NON-OB: CPT

## 2025-02-20 PROCEDURE — 99213 OFFICE O/P EST LOW 20 MIN: CPT

## 2025-03-05 ENCOUNTER — NON-APPOINTMENT (OUTPATIENT)
Age: 85
End: 2025-03-05

## 2025-03-05 ENCOUNTER — APPOINTMENT (OUTPATIENT)
Dept: CARDIOLOGY | Facility: CLINIC | Age: 85
End: 2025-03-05
Payer: MEDICARE

## 2025-03-05 VITALS
OXYGEN SATURATION: 97 % | BODY MASS INDEX: 28.67 KG/M2 | DIASTOLIC BLOOD PRESSURE: 68 MMHG | SYSTOLIC BLOOD PRESSURE: 148 MMHG | HEART RATE: 96 BPM | WEIGHT: 167 LBS

## 2025-03-05 DIAGNOSIS — E66.9 OBESITY, UNSPECIFIED: ICD-10-CM

## 2025-03-05 DIAGNOSIS — I10 ESSENTIAL (PRIMARY) HYPERTENSION: ICD-10-CM

## 2025-03-05 DIAGNOSIS — I45.10 UNSPECIFIED RIGHT BUNDLE-BRANCH BLOCK: ICD-10-CM

## 2025-03-05 DIAGNOSIS — I38 ENDOCARDITIS, VALVE UNSPECIFIED: ICD-10-CM

## 2025-03-05 DIAGNOSIS — E78.5 HYPERLIPIDEMIA, UNSPECIFIED: ICD-10-CM

## 2025-03-05 DIAGNOSIS — I34.0 NONRHEUMATIC MITRAL (VALVE) INSUFFICIENCY: ICD-10-CM

## 2025-03-05 PROCEDURE — 99213 OFFICE O/P EST LOW 20 MIN: CPT

## 2025-03-05 PROCEDURE — 93000 ELECTROCARDIOGRAM COMPLETE: CPT
